# Patient Record
Sex: FEMALE | Race: BLACK OR AFRICAN AMERICAN
[De-identification: names, ages, dates, MRNs, and addresses within clinical notes are randomized per-mention and may not be internally consistent; named-entity substitution may affect disease eponyms.]

---

## 2017-01-28 ENCOUNTER — HOSPITAL ENCOUNTER (EMERGENCY)
Dept: HOSPITAL 61 - ER | Age: 22
Discharge: HOME | End: 2017-01-28
Payer: COMMERCIAL

## 2017-01-28 VITALS — DIASTOLIC BLOOD PRESSURE: 64 MMHG | SYSTOLIC BLOOD PRESSURE: 117 MMHG

## 2017-01-28 VITALS — WEIGHT: 140 LBS | BODY MASS INDEX: 21.97 KG/M2 | HEIGHT: 67 IN

## 2017-01-28 DIAGNOSIS — Z3A.10: ICD-10-CM

## 2017-01-28 DIAGNOSIS — O20.0: Primary | ICD-10-CM

## 2017-01-28 LAB
BACTERIA #/AREA URNS HPF: (no result) /HPF
BILIRUB UR QL STRIP: NEGATIVE
GLUCOSE UR STRIP-MCNC: NEGATIVE MG/DL
NEG OBC UR: (no result)
NITRITE UR QL STRIP: NEGATIVE
PH UR STRIP: 7.5 [PH]
POS OBC UR: (no result)
PROT UR STRIP-MCNC: NEGATIVE MG/DL
RBC #/AREA URNS HPF: (no result) /HPF (ref 0–2)
SP GR UR STRIP: >=1.03
SQUAMOUS #/AREA URNS LPF: (no result) /LPF
UROBILINOGEN UR-MCNC: 1 MG/DL
WBC #/AREA URNS HPF: (no result) /HPF (ref 0–4)

## 2017-01-28 PROCEDURE — 84702 CHORIONIC GONADOTROPIN TEST: CPT

## 2017-01-28 PROCEDURE — 86901 BLOOD TYPING SEROLOGIC RH(D): CPT

## 2017-01-28 PROCEDURE — 36415 COLL VENOUS BLD VENIPUNCTURE: CPT

## 2017-01-28 PROCEDURE — 76801 OB US < 14 WKS SINGLE FETUS: CPT

## 2017-01-28 PROCEDURE — 86900 BLOOD TYPING SEROLOGIC ABO: CPT

## 2017-01-28 PROCEDURE — 81025 URINE PREGNANCY TEST: CPT

## 2017-01-28 PROCEDURE — 81001 URINALYSIS AUTO W/SCOPE: CPT

## 2017-01-28 PROCEDURE — 87086 URINE CULTURE/COLONY COUNT: CPT

## 2017-01-28 NOTE — PHYS DOC
Past Medical History


Past Medical History:  No Pertinent History


Past Surgical History:  No Surgical History


Alcohol Use:  None


Drug Use:  None





Adult General


Chief Complaint


Chief Complaint:  VAGINAL BLEEDING PREGNANCY





HPI


HPI


21-year-old female presents with 2 weeks of dark discharge that she is 

concerned could be blood. She denies any significant abdominal pain. This is 

her second pregnancy and she states the first was a miscarriage in the first 

trimester around 8 weeks area and she states that episode she had significant 

abdominal pain and bright red blood. She denies any symptoms like that today. 

She currently states she has no pain. She denies any dizziness or 

lightheadedness. Patient states she does have an OB doctor but has not yet had 

a ultrasound. She denies any history of significant health problems.





Review of Systems


Review of Systems





Constitutional: Denies fever or chills []


Eyes: Denies change in visual acuity, redness, or eye pain []


HENT: Denies nasal congestion or sore throat []


Respiratory: Denies cough or shortness of breath []


Cardiovascular: No additional information not addressed in HPI []


GI: Denies abdominal pain, nausea, vomiting, bloody stools or diarrhea []


: Denies dysuria or hematuria []


Musculoskeletal: Denies back pain or joint pain []


Integument: Denies rash or skin lesions []


Neurologic: Denies headache, focal weakness or sensory changes []


Endocrine: Denies polyuria or polydipsia []





Allergies


Allergies





 Allergies








Coded Allergies Type Severity Reaction Last Updated Verified


 


  No Known Drug Allergies    8/15/15 No











Physical Exam


Physical Exam





Constitutional: Well developed, well nourished, no acute distress, non-toxic 

appearance. []


HENT: Normocephalic, atraumatic, bilateral external ears normal, oropharynx 

moist, no oral exudates, nose normal. []


Eyes: PERRLA, EOMI, conjunctiva normal, no discharge. [] 


Neck: Normal range of motion, no tenderness, supple, no stridor. [] 


Cardiovascular:Heart rate regular rhythm, no murmur []


Lungs & Thorax:  Bilateral breath sounds clear to auscultation []


Abdomen: Bowel sounds normal, soft, no tenderness, no masses, no pulsatile 

masses. [] 


Pelvic exam: Cervical os appeared closed with a scant amount of normal 

discharge. There is no active bleeding seen. There is no CMT or adnexal 

tenderness.


Skin: Warm, dry, no erythema, no rash. [] 


Back: No tenderness, no CVA tenderness. [] 


Extremities: No tenderness, no cyanosis, no clubbing, ROM intact, no edema. [] 


Neurologic: Alert and oriented X 3, normal motor function, normal sensory 

function, no focal deficits noted. []


Psychologic: Affect normal, judgement normal, mood normal. []





Current Patient Data


Vital Signs





 Vital Signs








  Date Time  Temp Pulse Resp B/P Pulse Ox O2 Delivery O2 Flow Rate FiO2


 


1/28/17 19:35 98.3 82 16 132/67 100 Room Air  





 98.3       








Lab Values





 Laboratory Tests








Test


  1/28/17


19:45 1/28/17


19:56


 


Urine Collection Type Unknown   


 


Urine Color Yellow   


 


Urine Clarity Clear   


 


Urine pH 7.5   


 


Urine Specific Gravity >=1.030   


 


Urine Protein


  Negativemg/dL


(NEG-TRACE) 


 


 


Urine Glucose (UA)


  Negativemg/dL


(NEG) 


 


 


Urine Ketones (Stick)


  Negativemg/dL


(NEG) 


 


 


Urine Blood


  Negative (NEG)


  


 


 


Urine Nitrite


  Negative (NEG)


  


 


 


Urine Bilirubin


  Negative (NEG)


  


 


 


Urine Urobilinogen Dipstick


  1.0mg/dL (0.2


mg/dL) 


 


 


Urine Leukocyte Esterase


  Negative (NEG)


  


 


 


Urine RBC Occ/HPF (0-2)   


 


Urine WBC Occ/HPF (0-4)   


 


Urine Squamous Epithelial


Cells Mod/LPF  


  


 


 


Urine Bacteria


  Moderate/HPF


(0-FEW) 


 


 


Urine Mucus Marked/LPF   


 


Urine Pregnancy Test


  Positive (NEG)


  


 


 


Maternal Serum HCG Beta


Subunit 


  [id number]/mL


(0-6)  H











EKG


EKG


[]





Radiology/Procedures


Radiology/Procedures


OB ultrasound demonstrated the following:


FINDINGS 


The uterus measures 9.7 x 7.9 x 7.7 centimeter. There is a single 


intrauterine gestational sac containing a fetal pole and yolk sac. The CRL


measures 3.12 centimeter corresponding to 10 weeks and 0 days. This 


corresponds to EDC is 08/26/2017. Small subchorionic hemorrhage is seen. 


Fetal heart rate measures 171 beats per minute. 


Neither of the 2 ovaries is seen. 


There is free fluid in the posterior cul-de-sac.





Course & Med Decision Making


Course & Med Decision Making


Pertinent Labs and Imaging studies reviewed. (See chart for details)


 21-year-old female who has had some dark discharge for the last several weeks 

but no significant pain had a pelvic exam that did not reveal any abnormality. 

8 OB ultrasound in the first trimester demonstrated a single IUP with 

gestational age of approximately 10 weeks with a fetal heartbeat of 107 1 bpm. 

There was a sub-chorionic hemorrhage that will require attention on follow-up 

exams. This was communicated to the patient who will follow closely with her OB 

doctor in the next 48-72 hours for repeat examination and blood work





Dragon Disclaimer


Dragon Disclaimer


This electronic medical record was generated, in whole or in part, using a 

voice recognition dictation system.





Departure


Departure


Impression:  


 Primary Impression:  


 Threatened miscarriage


Disposition:  01 HOME, SELF-CARE


Condition:  STABLE


Referrals:  


RANDY ALCANTAR (PCP)








SHIRLEY ROGERS Jr, MD


Patient Instructions:  Threatened Miscarriage, Easy-to-Read





Additional Instructions:


Please follow-up with your OB doctor as instructed in the next 48 to 72 hours. 

Return to the ER if you develop any worsening bleeding or pain. Returnto the ER 

if you develop any fever or chills.








KESHAWN WILSON DO Jan 28, 2017 20:44

## 2017-01-28 NOTE — RAD
PROCEDURE 

Ob sonogram 1st trimester. 

 

HISTORY 

Spotting for 2 weeks, patient is approximately 10 weeks pregnant. No pain 

or cramping. 

 

TECHNIQUE 

Transvaginal. 

 

COMPARISON 

None available. 

 

FINDINGS 

The uterus measures 9.7 x 7.9 x 7.7 centimeter. There is a single 

intrauterine gestational sac containing a fetal pole and yolk sac. The CRL

measures 3.12 centimeter corresponding to 10 weeks and 0 days. This 

corresponds to EDC is 08/26/2017. Small subchorionic hemorrhage is seen. 

Fetal heart rate measures 171 beats per minute. 

Neither of the 2 ovaries is seen. 

There is free fluid in the posterior cul-de-sac. 

 

IMPRESSION 

Single intrauterine gestational sac containing a live fetal pole of 

maturity 10 weeks and 0 days with a heart rate of 171 beats per minute. 

Small subchorionic hemorrhage is seen, the exact size of this could not be

measured. Attention on followup exams. 

 

Electronically signed by: Chandrika Buckley MD (Jan 28, 2017 21:51:06)

## 2018-03-22 ENCOUNTER — HOSPITAL ENCOUNTER (EMERGENCY)
Dept: HOSPITAL 61 - ER | Age: 23
Discharge: HOME | End: 2018-03-22
Payer: COMMERCIAL

## 2018-03-22 VITALS — SYSTOLIC BLOOD PRESSURE: 117 MMHG | DIASTOLIC BLOOD PRESSURE: 59 MMHG

## 2018-03-22 DIAGNOSIS — Z3A.13: ICD-10-CM

## 2018-03-22 DIAGNOSIS — O20.0: Primary | ICD-10-CM

## 2018-03-22 LAB
BACTERIA,URINE: (no result) /HPF
BILIRUBIN,URINE: NEGATIVE
CLARITY,URINE: CLEAR
COLOR,URINE: YELLOW
GLUCOSE,URINE: NEGATIVE MG/DL
NITRITE,URINE: NEGATIVE
PH,URINE: 6.5
PROTEIN,URINE: 30 MG/DL
RBC,URINE: (no result) /HPF (ref 0–2)
SPECIFIC GRAVITY,URINE: 1.02
SQUAMOUS EPITHELIAL CELL,UR: (no result) /LPF
UROBILINOGEN,URINE: 1 MG/DL
WBC,URINE: (no result) /HPF (ref 0–4)

## 2018-03-22 PROCEDURE — 87086 URINE CULTURE/COLONY COUNT: CPT

## 2018-03-22 PROCEDURE — 81001 URINALYSIS AUTO W/SCOPE: CPT

## 2018-03-22 PROCEDURE — 99284 EMERGENCY DEPT VISIT MOD MDM: CPT

## 2019-01-25 ENCOUNTER — HOSPITAL ENCOUNTER (OUTPATIENT)
Dept: HOSPITAL 61 - KCIC US | Age: 24
Discharge: HOME | End: 2019-01-25
Attending: OBSTETRICS & GYNECOLOGY
Payer: MEDICAID

## 2019-01-25 DIAGNOSIS — N63.10: Primary | ICD-10-CM

## 2019-01-25 PROCEDURE — 76641 ULTRASOUND BREAST COMPLETE: CPT

## 2019-01-25 NOTE — [facility]
Right breast ultrasound:

 

Reason for examination: Right breast lump felt by the doctor but not felt 

by the patient.

 

Right whole breast ultrasound including evaluation of all 4 quadrants and 

the retroareolar and axillary regions of the right breast was performed. 

 

No cystic or solid nodules are seen. No abnormal appearing lymph nodes are

seen in the axilla.

 

IMPRESSION:

 

No focal abnormalities evident in the right breast and axilla. Recommend 

clinical follow-up and routine mammograms at age 41.

 

BI-RADS Category 1:  Negative.

 

"Our facility is accredited by the [employer] 

Mammography Program."

 

This patient's information has been entered into a reminder system for the

patient to be notified with the results of her examination and a target 

date for the next mammogram. 

 

Electronically signed by: Sherry Long MD (1/25/2019 10:46 AM) [id number]

## 2021-09-23 ENCOUNTER — HOSPITAL ENCOUNTER (EMERGENCY)
Dept: HOSPITAL 61 - ER | Age: 26
Discharge: HOME | End: 2021-09-23
Payer: COMMERCIAL

## 2021-09-23 VITALS — SYSTOLIC BLOOD PRESSURE: 97 MMHG | DIASTOLIC BLOOD PRESSURE: 56 MMHG

## 2021-09-23 VITALS — WEIGHT: 155.21 LBS | HEIGHT: 67 IN | BODY MASS INDEX: 24.36 KG/M2

## 2021-09-23 DIAGNOSIS — B34.9: Primary | ICD-10-CM

## 2021-09-23 DIAGNOSIS — Z20.822: ICD-10-CM

## 2021-09-23 PROCEDURE — 87426 SARSCOV CORONAVIRUS AG IA: CPT

## 2021-09-23 PROCEDURE — 99283 EMERGENCY DEPT VISIT LOW MDM: CPT

## 2021-09-23 PROCEDURE — U0003 INFECTIOUS AGENT DETECTION BY NUCLEIC ACID (DNA OR RNA); SEVERE ACUTE RESPIRATORY SYNDROME CORONAVIRUS 2 (SARS-COV-2) (CORONAVIRUS DISEASE [COVID-19]), AMPLIFIED PROBE TECHNIQUE, MAKING USE OF HIGH THROUGHPUT TECHNOLOGIES AS DESCRIBED BY CMS-2020-01-R: HCPCS

## 2021-09-23 NOTE — ED.ADGEN
Past Medical History


Past Medical History:  No Pertinent History


Past Surgical History:  No Surgical History


Smoking Status:  Never Smoker


Alcohol Use:  None


Drug Use:  None





General Adult


EDM:


Chief Complaint:  FEVER





HPI:


HPI:





Patient is a 26-year-old female who arrives ambulatory to the emergency 

department complaining of weakness as well as a fever which developed yesterday.

 Patient was in contact with a friend/coworker who developed symptoms similar to

this with nausea and vomiting yesterday.  Patient reports today she has 

experienced alternating bouts of hot and cold flashes.  Patient also states she 

has felt very lightheaded and weak during this time.  She is concerned that she 

may have contracted an illness from her friend.  Despite this she denies pain of

any kind.  She further denies any likelihood that she is pregnant as she 

recently had a menstrual cycle.  She further denies any neurological symptoms 

otherwise.  She is unaware whether or not she may have come in contact with 

somebody who may have Covid.  She does deny being short of air or having chest 

pain otherwise.  She is awake, alert and nontoxic-appearing





Review of Systems:


Review of Systems:


Constitutional:   Reports fatigue, fever and chills.  []


Eyes:   Denies change in visual acuity. []


HENT:   Denies nasal congestion or sore throat. [] 


Respiratory:   Denies cough or shortness of breath. [] 


Cardiovascular:   Denies chest pain or edema. [] 


GI:   Denies abdominal pain, nausea, vomiting, bloody stools or diarrhea. [] 


:  Denies dysuria. [] 


Musculoskeletal:   Denies back pain or joint pain. [] 


Integument:   Denies rash. [] 


Neurologic:   Reports generalized weakness.  [] 


Endocrine:   Denies polyuria or polydipsia. [] 


Lymphatic:  Denies swollen glands. [] 


Psychiatric:  Denies depression or anxiety. []





Allergies:


Allergies:





Allergies








Coded Allergies Type Severity Reaction Last Updated Verified


 


  No Known Drug Allergies    8/15/15 No











Physical Exam:


PE:





Constitutional: Well developed, well nourished, no acute distress, non-toxic 

appearance. []


HENT: Normocephalic, atraumatic, bilateral external ears normal, oropharynx 

moist, no oral exudates, nose normal. []


Eyes: PERRLA, EOMI, conjunctiva normal, no discharge. [] 


Neck: Normal range of motion, no tenderness, supple, no stridor. [] 


Cardiovascular:Heart rate regular rhythm, no murmur []


Lungs & Thorax:  Bilateral breath sounds clear to auscultation []


Abdomen: Bowel sounds normal, soft, no tenderness, no masses, no pulsatile 

masses. [] 


Skin: Warm, dry, no erythema, no rash. [] 


Back: No tenderness, no CVA tenderness. [] 


Extremities: No tenderness, no cyanosis, no clubbing, ROM intact, no edema. [] 


Neurologic: Alert and oriented X 3, normal motor function, normal sensory 

function, no focal deficits noted. []


Psychologic: Affect normal, judgement normal, mood normal. []





Current Patient Data:


Vital Signs:





                                   Vital Signs








  Date Time  Temp Pulse Resp B/P (MAP) Pulse Ox O2 Delivery O2 Flow Rate FiO2


 


9/23/21 12:50 97.7 54 18 97/56 (70) 100 Room Air  





 97.7       











EKG:


EKG:


[]





Heart Score:


C/O Chest Pain:  No


Risk Factors:


Risk Factors:  DM, Current or recent (<one month) smoker, HTN, HLP, family 

history of CAD, obesity.


Risk Scores:


Score 0 - 3:  2.5% MACE over next 6 weeks - Discharge Home


Score 4 - 6:  20.3% MACE over next 6 weeks - Admit for Clinical Observation


Score 7 - 10:  72.7% MACE over next 6 weeks - Early Invasive Strategies





Radiology/Procedures:


Radiology/Procedures:


[]





Course & Med Decision Making:


Course & Med Decision Making


Pertinent Labs and Imaging studies reviewed. (See chart for details)





The patient is very well-appearing at this time and does not exhibit the 

traditional symptoms associated with coronavirus.  Nonetheless the patient has 

been tested and advised to quarantine until those results are returned.  She 

denies nausea and states she is not requiring any medication at this time.  

Should she have any change in her condition have advised to return to the 

emergency department.





Dragon Disclaimer:


Dragon Disclaimer:


This electronic medical record was generated, in whole or in part, using a voice

 recognition dictation system.





Departure


Departure


Impression:  


   Primary Impression:  


   Viral syndrome


   Additional Impression:  


   Person under investigation for COVID-19


Disposition:  01 HOME / SELF CARE / HOMELESS


Condition:  STABLE


Referrals:  


NO PCP (PCP)


Patient Instructions:  Viral Syndrome





Additional Instructions:  


The patient has been instructed to quarantine until her coronavirus results have

 been returned.  Should she develop any shortness of air or chest pain have 

advised she return to the emergency department.  The patient understands and has

 agreed to do so.





Problem Qualifiers











DIMPLE BALBUENA DO               Sep 23, 2021 13:44